# Patient Record
Sex: FEMALE | Race: BLACK OR AFRICAN AMERICAN | URBAN - METROPOLITAN AREA
[De-identification: names, ages, dates, MRNs, and addresses within clinical notes are randomized per-mention and may not be internally consistent; named-entity substitution may affect disease eponyms.]

---

## 2018-04-24 ENCOUNTER — HISTORICAL (OUTPATIENT)
Dept: ADMINISTRATIVE | Facility: HOSPITAL | Age: 63
End: 2018-04-24

## 2018-04-24 LAB
ALBUMIN SERPL-MCNC: 3.8 GM/DL (ref 3.4–5)
ALBUMIN/GLOB SERPL: 1 RATIO (ref 1–2)
ALP SERPL-CCNC: 138 UNIT/L (ref 45–117)
ALT SERPL-CCNC: 34 UNIT/L (ref 12–78)
AST SERPL-CCNC: 21 UNIT/L (ref 15–37)
BILIRUB SERPL-MCNC: 0.3 MG/DL (ref 0.2–1)
BILIRUBIN DIRECT+TOT PNL SERPL-MCNC: 0.1 MG/DL
BILIRUBIN DIRECT+TOT PNL SERPL-MCNC: 0.2 MG/DL
BUN SERPL-MCNC: 13 MG/DL (ref 7–18)
CALCIUM SERPL-MCNC: 8.8 MG/DL (ref 8.5–10.1)
CHLORIDE SERPL-SCNC: 107 MMOL/L (ref 98–107)
CO2 SERPL-SCNC: 31 MMOL/L (ref 21–32)
CREAT SERPL-MCNC: 0.6 MG/DL (ref 0.6–1.3)
ERYTHROCYTE [DISTWIDTH] IN BLOOD BY AUTOMATED COUNT: 11.9 % (ref 11.5–14.5)
GLOBULIN SER-MCNC: 3.5 GM/ML (ref 2.3–3.5)
GLUCOSE SERPL-MCNC: 107 MG/DL (ref 74–106)
HCT VFR BLD AUTO: 38.4 % (ref 35–46)
HGB BLD-MCNC: 13 GM/DL (ref 12–16)
MCH RBC QN AUTO: 34.4 PG (ref 26–34)
MCHC RBC AUTO-ENTMCNC: 33.9 GM/DL (ref 31–37)
MCV RBC AUTO: 101.6 FL (ref 80–100)
PHENYTOIN SERPL-MCNC: 26.7 MCG/ML (ref 10–20)
PLATELET # BLD AUTO: 299 X10(3)/MCL (ref 130–400)
PMV BLD AUTO: 9.4 FL (ref 7.4–10.4)
POTASSIUM SERPL-SCNC: 4 MMOL/L (ref 3.5–5.1)
PROT SERPL-MCNC: 7.3 GM/DL (ref 6.4–8.2)
RBC # BLD AUTO: 3.78 X10(6)/MCL (ref 4–5.2)
SODIUM SERPL-SCNC: 143 MMOL/L (ref 136–145)
WBC # SPEC AUTO: 5.2 X10(3)/MCL (ref 4.5–11)

## 2018-05-01 ENCOUNTER — HISTORICAL (OUTPATIENT)
Dept: SURGERY | Facility: HOSPITAL | Age: 63
End: 2018-05-01

## 2020-06-23 ENCOUNTER — HISTORICAL (OUTPATIENT)
Dept: ADMINISTRATIVE | Facility: HOSPITAL | Age: 65
End: 2020-06-23

## 2022-04-10 ENCOUNTER — HISTORICAL (OUTPATIENT)
Dept: ADMINISTRATIVE | Facility: HOSPITAL | Age: 67
End: 2022-04-10

## 2022-04-29 VITALS
DIASTOLIC BLOOD PRESSURE: 77 MMHG | BODY MASS INDEX: 36.32 KG/M2 | SYSTOLIC BLOOD PRESSURE: 148 MMHG | HEIGHT: 63 IN | WEIGHT: 205 LBS

## 2022-04-30 NOTE — OP NOTE
Patient:   Lisette Blanchard            MRN: [id number]            FIN: [id number]               Age:   63 years     Sex:  Female     :  1955   Associated Diagnoses:   None   Author:   Irma Grier MD      Surgeon: Irma Grier MD    Assistant: Irma Grier MD    Staff: Gigi Aquino MD    DATE OF SURGERY: 18    Procedure: COMPLEX cataract extraction with synechialysis, implantation of capsular tension ring, implantation of intraocular lens, pupilloplasty, and Ahmed valve placement, right eye    Pre-operative diagnosis: Uveitic glaucoma and cataract    Post-operative diagnosis: Same    Implants: Ahmed valve FP7, capsular tension ring    Anesthesia: GETA    Complications: None    EBL: < 5 cc    DESCRIPTION OF PROCEDURE:  The patient has a history of painless progressive vision loss due to uveitis and cataract, 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 After discussion of the risks, benefits and alternatives of glaucoma surgery, including, but not limited to, the risk of infection, retinal detachment, need for additional surgery, loss of vision, and loss of the eye, the patient voiced good understanding and wished to proceed. Informed consent was obtained.    DESCRIPTION OF PROCEDURE:  The patients IOL calculations and lens selection were reviewed and confirmed. 1% Mydriacil, 2.5% phenylephrine, and 1% Cyclogyl drops were instilled. The patient was brought to the operating room and placed in supine position. After adequate anestheia was achieved, the eye was prepped and draped in sterile fashion using 5% Betadine. A sterile lid speculum placed in the palpebral fissure. Two paracentesis incisions was created at 10 and 2 o'clock, through which lidocaine and then viscoelastic were used to fill the anterior chamber. The viscoelastic cannula was used to perform gentle synechialysis. A 2.4 mm keratome  "blade was used to create a temporal biplanar clear corneal incision. Vannas scissors were used to cut a small strand of iris superiorly that was limiting dilation. A cystitome and Utrata forceps were then used to fashion a continuous curvilinear capsulorrhexis. Hydrodissection and hydrodelineation were performed with BSS on a hydrodissection cannula. Phacoemulsification of the nucleus was carried out using chopping technique, and most remaining epinuclear and cortical material was removed using the I&A handpiece. Zonular laxity was noted, and some cortical material was left in the bag to limit the risk of zonular dehisence. Viscoelastic was used to fill the capsular bag. A capsular tension ring was implanted into the bag. The intraocular lens was implanted into the bag.    Pupilloplasty was then carried out. A 10-0 Prolene suture on a CIF needle was inserted through the nasal paracentesis and used to engage the iris nasally, then passed temporally, engaging the iris temporally, then externalized at the limbus. The suture tails were externalized through the main wound. Upon tying, the nasal suture "cheesewired" through the iris. The iris was noted to be friable. The procedure was successfully repeated with less tension on the iris. The tails were tied and cut.    Attention was turned to the Ahmed valve. A 6-0 Vicryl traction suture was placed superiorly in the cornea, and the eye was infraducted. A conjunctival peritomy and radial conjunctival incisions were made superotemporally using Nikkie scissors. Sub-Tenon lidocaine with epinephrine were injected for dissection of Tenon's capsule from sclera. Blunt Nikkie scissors were used to dissect Tenon's capsule from sclera, taking care to avoid the extraocular muscles. Bipolar electrocautery was used to maintain hemostasis. The Ahmed valve was successfully primed using sterile saline and a 30G cannula. The reservoir was placed directly onto the sclera and tucked into " place deep to the conjunctiva, and a 10-0 nylon suture passed through the tube and episclera was used to secure the valve in place. A pericardium patch graft was sutured over the tube to the episclera using 10-0 nylon. The traction suture was cut and removed. The conjunctiva was closed using simple interrupted 8-0 Vicryl sutures.    The lid speculum was removed under direct visualization. Topical Pred-Forte and Vigamox eye drops were instilled. The eye was covered with a shield and patch. The patient tolerated the procedure well without complications. She was extubated and taken to the recovery room instable condition with instructions to follow up the following morning

## 2022-04-30 NOTE — H&P
* Final Report *  Document Contains Addenda  Ophthalmic Examination Visit *     Patient:   Listete Blanchard            MRN: [id number]            FIN: [id number]               Age:   63 years     Sex:  Female     :  1955   Associated Diagnoses:   None   Author:   Macrina Chen MD      Chief Complaint   2018 9:28 CDT        c/o burning somethimes OU, no present eye pain.  RTC for IOP check        History of Present Illness   PT here for IOP cehck       Health Status   Allergies:    Allergic Reactions (Selected)  No Known Medication Allergies   Current medications:    Home Medications (23) Active  amitriptyline 25 mg oral tablet   amlodipine 10 mg oral tablet   Artificial Tears preserved ophthalmic solution   atropine 1% ophthalmic solution 2 drop(s), Eye-Left, BID  atropine 1% ophthalmic solution 1 drop(s), Eye-Both, Daily  brimonidine 0.2% ophthalmic solution 1 drop(s), OPTH, q8hr  carvedilol 12.5 mg oral tablet 18.75 mg = 1.5 tab(s), Oral, BID  cetirizine 10 mg oral tablet   cloniDINE 0.1 mg oral tablet   etodolac 400 mg oral tablet 400 mg = 1 tab(s), Oral, BID  folic acid 1 mg oral tablet   folic acid 1 mg oral tablet   levetiracetam 500 mg oral tablet 500 mg = 1 tab(s), Oral, BID  meloxicam 15 mg oral tablet 15 mg = 1 tab(s), Oral, Daily  methotrexate 2.5 mg oral tablet   phenytoin 100 mg oral capsule, extended release   Pred Forte 1% ophthalmic suspension 1 drop(s), Eye-Both, BID  Pred Forte 1% ophthalmic suspension 1 drop(s), Eye-Both, BID  prednisONE 20 mg oral tablet 20 mg = 1 tab(s), Oral, Daily  timolol maleate 0.5% ophthalmic solution 1 drop(s), Eye-Left, BID  timolol maleate 0.5% ophthalmic solution 1 drop(s), Eye-Left, BID  timolol maleate 0.5% ophthalmic solution 1 drop(s), OPTH, BID  Vitamin B Complex with Iron and Intrinsic Factor oral capsule      Problem list:    All Problems  Morbid obesity / SNOMED CT 732188971 / Probable      Impression and Plan   VAcc  Right Eye:  20/400 PH 20/200  Left Eye: 20/100 PH 20/60 MRx (-2.25 +0.25 x 165) 20/50 +1    Tp: 13//14   Ta: 18//14     CCT: 513//503    Prior Gonio: OD with 90 degrees of PAS inferiorly, otherwise open to SS // OS with extensive intermittent  with periodic opening to SS    Pupils: polycoria ou  EOM full ou  CVF full to CF OU    Slit-Lamp Examination:  External: wnl OU  Lids/Lashes: Ptosis OD. otherwise wnl OU.  Conjunctiva/Sclera: white & quiet OU  Cornea: endopigment & early peripheral band keratopathy // tr endopigment & early peripheral band keratopathy  Anterior Chamber: Deep and quiet OU  Iris: polycoria with inferior iris atrophy, ? stretched superior PI // iris atrophy with sup PI  Lens: zonules visible from 8-12 o'clock NSC 2+ PSC 3+ // PCIOL with PCO s/p yag  Anterior poor view but no visble cell OU    Dilated Fundus Exam (6/2017)  ON: PPA // PPA, pallor CDR: 0.4 // 0.9  Macula: flat OU  Vessels: no sheathing, + attenuation OS  periphery: far peripheral punched out lesions 360 OU ranging 100-700um, no overlying infiltrates, no NVE, no RD, no tears OU  OCT mac (4/2/18)  Indication: Panuveitis OU  OD: Unable  OS: Flat, good foveal contour   OCT RNFL (4/2/18)  Indication: Glaucoma OU   OD: Unable   OS: 62; red S/I     Workup reviewed per old paper chart from 2011  ACE positive  RPR, lyme, lysozyme, HLAB27, HIV, toxoplasmosis- all negative  CXR and CT chest negative  PPD negative    Repeat Workup 6/17/16  - CRP 0.7  -ACE - Lab Core * 58  -Complement C3-LC * 143  -Complement C4-LC * 38  -HLA-B27 LC * Negative  -Lysozyme-LC * 6.6-  TPO Ab-LC * 52-ESR * 8  -DNA Ab DS-LC * <1  -CCP IgG/IgA-LC * 4  -RNA PolyIII IgG-LC * 1.1-  FERNANDO by IFA-LC * Negative  -RNP Ab-LC <0.2-Smith Ab-LC <0.2  -Anti-SSA-L:C <0.2, <0.2  -Anti-SSB -LC * H 3.4  -[id number] Ab-LC <0.2  -RF IgG-LC * 8.8  -RF IgM-LC * 3.2  -RF IgA-LC * 5.9  -Centromere B Ab-LC * <0.2  -RA Latex-LC 9.1    8/1/16:  - HIV: nonreactive  - HAV Ag: nonreactive  - HBsAg:  neg  - HBV core IgM: nonreactive  - HCV Ab: nonreactive  - Quantiferon gold: nonreactive  - CXR: WNL  Assessment/Plan:   1. 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  - IVFA is limited 2/2 no peripheral sweeps but no active lesions seen on films.  today continues be quiet OD with rare cell OS  - Prior to cataract surgery will likely need a loading dose of pred forte   - Was taken off of methotrexate by rheumatology, with improved inflammation continue to monitor off of methotrexate  - Patient with rebound inflammation after stopping PF initially so may never be able to stop completely.  - Pt has not seen rheumatology in > 1 year. Referal for new appointment sent last visit   - Pt is quiet again today, continue PF BID OD and qd OS (using BID both eyes which is okay)     2. Cataract OD  - VS, impeding retinal evaluation and treatment. Recommend CE-IOL  - IOL Calcs done 11/8/17  -Zonules seen from 8-12 OD, possibly traumatic etiology?   -unclear etiology of polycoria, possible reigers vs. inflammation but not bilateral and no iris processes seen on gonio, patient otherwise states she had surgery for high pressures in [place] several years ago due to pain in her right eye  - AC quiet again today  - While ideally we would like pt to be established with rheum prior to proceeding with surgery, suspect it will be months prior to getting appointment. Given improved IOP today on drops, will plan for CE/IOL OD   - Takes ASA 81: OK to continue  - Patient with HTN & seizure disorder -> will send to PCP (Dr. Whyte) for medical clearance  - IOL master obtained previously   - Will plan for CE/IOL OD on 5/1/18 with iSert +24.5D to aim for -0.46  with possible pupilloplasty  under GENERAL anesthesia - will also place Ahmed Valve OD at time of surgery     3. PCIOL OS s/p YAG cap on 7/24/15  - Membrane still attached inferiorly but visual axis clear- difficult refraction    4. Band keratopathy OU, mild  - Likey 2/2 to chronic uveitis- currently stable tr c/f  - Frequent lubrication PRN     5. Probable Uveitis Glaucoma OU  - Large CDR (0.9) in the setting of OHTN  - OCT (8/1/16): 45//35 with poor signal  - OCT (4/2/18): unable OD// 62 OS (S/I red)   - IOP improved today on drops   - Continue Timolol to BID OU, Brimonidine TID OU  - Will place Ahmed valve OD at time at cataract surgery     RTC POD #1 (5/2/18)        Addendum by Gigi Aquino MD on April 02, 2018 11:34 CDT (Verified)  patient seen/examined  -OD:    -conj: clear   -K: small band temporally   -AC: d/q   -lens: lack of zonules temp 180, 2-3+ nsc, cc; synech medially   -iris: corectopia and broad defects    IMP:  -vis sig cat OD: with zonular weakness  -panuveitis: quiet  -secondary glaucoma OU: IOP stable    PLAN:  -discussed CE c IOL OD with AGV and pupilloplasty - will likely need  CTR, possible sew in IOL, along with other extraordinary measures  -will do under GET anesthesia to minimize risk and maximize comfort    Addendum by Macrina Chen MD on April 02, 2018 12:36 CDT (Verified)  CCT not done today. CCT listed in note is last CCT ([id number])    Result type: Office/Clinic Note-Physician  Result date: April 02, 2018 10:19 CDT  Result status: Modified  Result title: Ophthalmic Examination Visit *  Performed by: Macrina Chen MD on April 02, 2018 11:27 CDT  Verified by: Macrina Chen MD on April 02, 2018 11:27 CDT  Encounter info: [id number], [facility], Clinic Visit, 4/2/2018 - 4/2/2018    Doc has been moved by HIM specialist.

## 2025-07-09 DIAGNOSIS — G89.29 CHRONIC PAIN: Primary | ICD-10-CM
